# Patient Record
Sex: MALE | Race: WHITE | NOT HISPANIC OR LATINO | URBAN - METROPOLITAN AREA
[De-identification: names, ages, dates, MRNs, and addresses within clinical notes are randomized per-mention and may not be internally consistent; named-entity substitution may affect disease eponyms.]

---

## 2020-10-01 ENCOUNTER — AMBULATORY SURGICAL CENTER (OUTPATIENT)
Dept: URBAN - METROPOLITAN AREA SURGERY 3 | Facility: SURGERY | Age: 65
End: 2020-10-01
Payer: COMMERCIAL

## 2020-10-01 VITALS
HEART RATE: 55 BPM | OXYGEN SATURATION: 98 % | HEART RATE: 54 BPM | DIASTOLIC BLOOD PRESSURE: 56 MMHG | DIASTOLIC BLOOD PRESSURE: 56 MMHG | HEART RATE: 55 BPM | SYSTOLIC BLOOD PRESSURE: 164 MMHG | SYSTOLIC BLOOD PRESSURE: 164 MMHG | SYSTOLIC BLOOD PRESSURE: 164 MMHG | DIASTOLIC BLOOD PRESSURE: 55 MMHG | SYSTOLIC BLOOD PRESSURE: 111 MMHG | TEMPERATURE: 96.5 F | RESPIRATION RATE: 16 BRPM | DIASTOLIC BLOOD PRESSURE: 84 MMHG | DIASTOLIC BLOOD PRESSURE: 65 MMHG | HEIGHT: 69 IN | RESPIRATION RATE: 16 BRPM | DIASTOLIC BLOOD PRESSURE: 55 MMHG | TEMPERATURE: 96.5 F | RESPIRATION RATE: 20 BRPM | RESPIRATION RATE: 20 BRPM | SYSTOLIC BLOOD PRESSURE: 111 MMHG | SYSTOLIC BLOOD PRESSURE: 109 MMHG | SYSTOLIC BLOOD PRESSURE: 105 MMHG | OXYGEN SATURATION: 97 % | HEIGHT: 69 IN | SYSTOLIC BLOOD PRESSURE: 105 MMHG | DIASTOLIC BLOOD PRESSURE: 55 MMHG | RESPIRATION RATE: 16 BRPM | HEART RATE: 64 BPM | DIASTOLIC BLOOD PRESSURE: 74 MMHG | HEIGHT: 69 IN | OXYGEN SATURATION: 97 % | HEART RATE: 54 BPM | WEIGHT: 145 LBS | HEART RATE: 57 BPM | HEART RATE: 55 BPM | OXYGEN SATURATION: 100 % | WEIGHT: 145 LBS | HEART RATE: 64 BPM | TEMPERATURE: 98.4 F | HEART RATE: 57 BPM | SYSTOLIC BLOOD PRESSURE: 105 MMHG | OXYGEN SATURATION: 98 % | HEART RATE: 57 BPM | HEART RATE: 64 BPM | RESPIRATION RATE: 18 BRPM | OXYGEN SATURATION: 98 % | DIASTOLIC BLOOD PRESSURE: 65 MMHG | OXYGEN SATURATION: 97 % | OXYGEN SATURATION: 100 % | HEART RATE: 54 BPM | RESPIRATION RATE: 18 BRPM | SYSTOLIC BLOOD PRESSURE: 111 MMHG | DIASTOLIC BLOOD PRESSURE: 65 MMHG | SYSTOLIC BLOOD PRESSURE: 109 MMHG | WEIGHT: 145 LBS | TEMPERATURE: 98.4 F | DIASTOLIC BLOOD PRESSURE: 74 MMHG | RESPIRATION RATE: 20 BRPM | DIASTOLIC BLOOD PRESSURE: 84 MMHG | DIASTOLIC BLOOD PRESSURE: 56 MMHG | DIASTOLIC BLOOD PRESSURE: 74 MMHG | OXYGEN SATURATION: 100 % | TEMPERATURE: 96.5 F | TEMPERATURE: 98.4 F | SYSTOLIC BLOOD PRESSURE: 109 MMHG | RESPIRATION RATE: 18 BRPM | DIASTOLIC BLOOD PRESSURE: 84 MMHG

## 2020-10-01 DIAGNOSIS — K64.1 SECOND DEGREE HEMORRHOIDS: ICD-10-CM

## 2020-10-01 DIAGNOSIS — Z12.11 ENCOUNTER FOR SCREENING FOR MALIGNANT NEOPLASM OF COLON: ICD-10-CM

## 2024-05-30 ENCOUNTER — TELEPHONE (OUTPATIENT)
Dept: CARDIOLOGY | Facility: CLINIC | Age: 69
End: 2024-05-30
Payer: MEDICARE

## 2024-05-30 NOTE — TELEPHONE ENCOUNTER
----- Message from Deniz Beard sent at 5/30/2024  2:08 PM CDT -----  Type:  Sooner Appointment Request    Caller is requesting a sooner appointment.  Caller declined first available appointment listed below.  Caller will not accept being placed on the waitlist and is requesting a message be sent to doctor.    Name of Caller:  pt  When is the first available appointment?  none  Symptoms:  est care/ 6 month f/u from previous PCP--had bypass  Would the patient rather a call back or a response via [facility]? call  Best Call Back Number:  [phone] (home)     Additional Information:  please call and advise--thank you

## 2024-07-31 ENCOUNTER — LAB VISIT (OUTPATIENT)
Dept: LAB | Facility: HOSPITAL | Age: 69
End: 2024-07-31
Attending: INTERNAL MEDICINE
Payer: MEDICARE

## 2024-07-31 ENCOUNTER — OFFICE VISIT (OUTPATIENT)
Dept: CARDIOLOGY | Facility: CLINIC | Age: 69
End: 2024-07-31
Payer: MEDICARE

## 2024-07-31 VITALS
BODY MASS INDEX: 21.62 KG/M2 | SYSTOLIC BLOOD PRESSURE: 140 MMHG | HEIGHT: 69 IN | RESPIRATION RATE: 16 BRPM | WEIGHT: 146 LBS | HEART RATE: 51 BPM | DIASTOLIC BLOOD PRESSURE: 70 MMHG | OXYGEN SATURATION: 98 %

## 2024-07-31 DIAGNOSIS — F41.9 ANXIETY DISORDER, UNSPECIFIED TYPE: ICD-10-CM

## 2024-07-31 DIAGNOSIS — R94.31 NONSPECIFIC ABNORMAL ELECTROCARDIOGRAM (ECG) (EKG): ICD-10-CM

## 2024-07-31 DIAGNOSIS — F32.A DEPRESSION, UNSPECIFIED DEPRESSION TYPE: ICD-10-CM

## 2024-07-31 DIAGNOSIS — I10 ESSENTIAL HYPERTENSION: ICD-10-CM

## 2024-07-31 DIAGNOSIS — I25.10 CAD IN NATIVE ARTERY: Primary | ICD-10-CM

## 2024-07-31 DIAGNOSIS — I25.10 CAD IN NATIVE ARTERY: ICD-10-CM

## 2024-07-31 DIAGNOSIS — Z95.1 HX OF CABG: ICD-10-CM

## 2024-07-31 DIAGNOSIS — E78.2 MIXED HYPERLIPIDEMIA: ICD-10-CM

## 2024-07-31 LAB
ALBUMIN SERPL BCP-MCNC: 4.6 G/DL (ref 3.5–5.2)
ALP SERPL-CCNC: 74 U/L (ref 55–135)
ALT SERPL W/O P-5'-P-CCNC: 24 U/L (ref 10–44)
ANION GAP SERPL CALC-SCNC: 11 MMOL/L (ref 8–16)
AST SERPL-CCNC: 23 U/L (ref 10–40)
BILIRUB SERPL-MCNC: 1 MG/DL (ref 0.1–1)
BUN SERPL-MCNC: 14 MG/DL (ref 8–23)
CALCIUM SERPL-MCNC: 9.2 MG/DL (ref 8.7–10.5)
CHLORIDE SERPL-SCNC: 105 MMOL/L (ref 95–110)
CHOLEST SERPL-MCNC: 127 MG/DL (ref 120–199)
CHOLEST/HDLC SERPL: 2.8 {RATIO} (ref 2–5)
CO2 SERPL-SCNC: 25 MMOL/L (ref 23–29)
CREAT SERPL-MCNC: 0.9 MG/DL (ref 0.5–1.4)
ERYTHROCYTE [DISTWIDTH] IN BLOOD BY AUTOMATED COUNT: 13.1 % (ref 11.5–14.5)
EST. GFR  (NO RACE VARIABLE): >60 ML/MIN/1.73 M^2
GLUCOSE SERPL-MCNC: 107 MG/DL (ref 70–110)
HCT VFR BLD AUTO: 39.6 % (ref 40–54)
HDLC SERPL-MCNC: 46 MG/DL (ref 40–75)
HDLC SERPL: 36.2 % (ref 20–50)
HGB BLD-MCNC: 13.4 G/DL (ref 14–18)
LDLC SERPL CALC-MCNC: 53 MG/DL (ref 63–159)
MCH RBC QN AUTO: 30.6 PG (ref 27–31)
MCHC RBC AUTO-ENTMCNC: 33.8 G/DL (ref 32–36)
MCV RBC AUTO: 90 FL (ref 82–98)
NONHDLC SERPL-MCNC: 81 MG/DL
PLATELET # BLD AUTO: 221 K/UL (ref 150–450)
PMV BLD AUTO: 9.5 FL (ref 9.2–12.9)
POTASSIUM SERPL-SCNC: 3.9 MMOL/L (ref 3.5–5.1)
PROT SERPL-MCNC: 6.9 G/DL (ref 6–8.4)
RBC # BLD AUTO: 4.38 M/UL (ref 4.6–6.2)
SODIUM SERPL-SCNC: 141 MMOL/L (ref 136–145)
TRIGL SERPL-MCNC: 140 MG/DL (ref 30–150)
WBC # BLD AUTO: 6.19 K/UL (ref 3.9–12.7)

## 2024-07-31 PROCEDURE — 99213 OFFICE O/P EST LOW 20 MIN: CPT | Mod: PBBFAC,PN | Performed by: INTERNAL MEDICINE

## 2024-07-31 PROCEDURE — 80061 LIPID PANEL: CPT | Performed by: INTERNAL MEDICINE

## 2024-07-31 PROCEDURE — 99205 OFFICE O/P NEW HI 60 MIN: CPT | Mod: S$PBB,,, | Performed by: INTERNAL MEDICINE

## 2024-07-31 PROCEDURE — 36415 COLL VENOUS BLD VENIPUNCTURE: CPT | Performed by: INTERNAL MEDICINE

## 2024-07-31 PROCEDURE — 80053 COMPREHEN METABOLIC PANEL: CPT | Performed by: INTERNAL MEDICINE

## 2024-07-31 PROCEDURE — 85027 COMPLETE CBC AUTOMATED: CPT | Performed by: INTERNAL MEDICINE

## 2024-07-31 PROCEDURE — 99999 PR PBB SHADOW E&M-EST. PATIENT-LVL III: CPT | Mod: PBBFAC,,, | Performed by: INTERNAL MEDICINE

## 2024-07-31 RX ORDER — CLOPIDOGREL BISULFATE 75 MG/1
75 TABLET ORAL DAILY
Qty: 30 TABLET | Refills: 11 | Status: SHIPPED | OUTPATIENT
Start: 2024-07-31 | End: 2025-07-31

## 2024-07-31 RX ORDER — ASPIRIN 81 MG/1
81 TABLET ORAL DAILY
COMMUNITY
Start: 2024-06-03

## 2024-07-31 RX ORDER — AMLODIPINE BESYLATE 5 MG/1
5 TABLET ORAL 2 TIMES DAILY
Qty: 180 TABLET | Refills: 3 | Status: SHIPPED | OUTPATIENT
Start: 2024-07-31 | End: 2025-07-31

## 2024-07-31 RX ORDER — EZETIMIBE 10 MG/1
10 TABLET ORAL DAILY
Qty: 90 TABLET | Refills: 3 | Status: SHIPPED | OUTPATIENT
Start: 2024-07-31 | End: 2025-07-31

## 2024-07-31 RX ORDER — CARVEDILOL 6.25 MG/1
6.25 TABLET ORAL 2 TIMES DAILY
COMMUNITY
Start: 2024-06-03 | End: 2024-07-31 | Stop reason: SDUPTHER

## 2024-07-31 RX ORDER — ALPRAZOLAM 0.5 MG/1
0.5 TABLET ORAL 2 TIMES DAILY PRN
COMMUNITY
Start: 2024-06-03

## 2024-07-31 RX ORDER — SERTRALINE HYDROCHLORIDE 50 MG/1
50 TABLET, FILM COATED ORAL DAILY
COMMUNITY
Start: 2024-04-19

## 2024-07-31 RX ORDER — AMLODIPINE BESYLATE 5 MG/1
1 TABLET ORAL 2 TIMES DAILY
COMMUNITY
Start: 2024-06-03 | End: 2024-07-31 | Stop reason: SDUPTHER

## 2024-07-31 RX ORDER — CLOPIDOGREL BISULFATE 75 MG/1
75 TABLET ORAL DAILY
COMMUNITY
Start: 2024-06-03 | End: 2024-07-31 | Stop reason: SDUPTHER

## 2024-07-31 RX ORDER — ROSUVASTATIN CALCIUM 10 MG/1
10 TABLET, COATED ORAL NIGHTLY
Qty: 90 TABLET | Refills: 3 | Status: SHIPPED | OUTPATIENT
Start: 2024-07-31 | End: 2025-07-31

## 2024-07-31 RX ORDER — AMOXICILLIN 500 MG
1 CAPSULE ORAL DAILY
COMMUNITY

## 2024-07-31 RX ORDER — CARVEDILOL 6.25 MG/1
6.25 TABLET ORAL 2 TIMES DAILY
Qty: 180 TABLET | Refills: 3 | Status: SHIPPED | OUTPATIENT
Start: 2024-07-31 | End: 2025-07-31

## 2024-07-31 RX ORDER — LANOLIN ALCOHOL/MO/W.PET/CERES
400 CREAM (GRAM) TOPICAL DAILY
COMMUNITY

## 2024-07-31 RX ORDER — EZETIMIBE 10 MG/1
1 TABLET ORAL DAILY
COMMUNITY
Start: 2024-06-03 | End: 2024-07-31 | Stop reason: SDUPTHER

## 2024-07-31 RX ORDER — ROSUVASTATIN CALCIUM 10 MG/1
10 TABLET, COATED ORAL NIGHTLY
COMMUNITY
Start: 2024-06-03 | End: 2024-07-31 | Stop reason: SDUPTHER

## 2024-07-31 NOTE — PROGRESS NOTES
Subjective:    Patient ID:  Manpreet Cummings is a 69 y.o. male      Chief Complaint   Patient presents with    Coronary Artery Disease    Follow-up       HPI:  Mr Manpreet Cummings is a 69 y.o. male is here for initial consultation.  [place] and he is reestablishing himself.    Patient had coronary artery disease and CABG about 2 years ago and has done well and he follows up with his cardiologist on regular basis.  At the present time patient is comfortable seated in chair not in any acute distress his breathing is good denies any shortness a breath or difficulty in breathing denies any dizziness or lightheadedness or loss of consciousness.  He is taking all his medications regularly.    Normally his blood pressure runs around in 130-135 in the range and diastolic is about 70-75.  Patient is active and he is an active runner and he runs on a regular basis.  And has been doing that for the past 25 years.  And is taking all his medications regularly.      He has not had any recent blood work.        Review of patient's allergies indicates:  No Known Allergies    Past Medical History:   Diagnosis Date    Coronary artery disease     Hyperlipidemia     Hypertension      Past Surgical History:   Procedure Laterality Date    CARDIAC CATHETERIZATION      CORONARY ARTERY BYPASS GRAFT       Social History     Tobacco Use    Smoking status: Never    Smokeless tobacco: Never     No family history on file.     Review of Systems:   Constitution: Negative for diaphoresis and fever.   HEENT: Negative for nosebleeds.    Cardiovascular: Negative for chest pain       No dyspnea on exertion       No leg swelling        No palpitations  Respiratory: Negative for shortness of breath and wheezing.    Hematologic/Lymphatic: Negative for bleeding problem. Does not bruise/bleed easily.   Skin: Negative for color change and rash.   Musculoskeletal: Negative for falls and myalgias.   Gastrointestinal: Negative for  "hematemesis and hematochezia.   Genitourinary: Negative for hematuria.   Neurological: Negative for dizziness and light-headedness.   Psychiatric/Behavioral: Negative for altered mental status and memory loss.          Objective:        Vitals:    07/31/24 0914   BP: (!) 140/70   Pulse: (!) 51   Resp: 16       No results found for: "WBC", "HGB", "HCT", "PLT", "CHOL", "TRIG", "HDL", "LDLDIRECT", "ALT", "AST", "NA", "K", "CL", "CREATININE", "BUN", "CO2", "TSH", "PSA", "INR", "GLUF", "HGBA1C", "MICROALBUR"     ECHOCARDIOGRAM RESULTS  No results found for this or any previous visit.        CURRENT/PREVIOUS VISIT EKG  No results found for this or any previous visit.  No valid procedures specified.   No results found for this or any previous visit.      Physical Exam:  CONSTITUTIONAL: No fever, no chills  HEENT: Normocephalic, atraumatic,pupils reactive to light                 NECK:  No JVD no carotid bruit  CVS: S1S2+, RRR, systolic murmurs,   LUNGS: Clear  ABDOMEN: Soft, NT, BS+  EXTREMITIES: No cyanosis, edema  : No gomez catheter  NEURO: AAO X 3  PSY: Normal affect      Medication List with Changes/Refills   Current Medications    ALPRAZOLAM (XANAX) 0.5 MG TABLET    Take 0.5 mg by mouth 2 (two) times daily as needed for Anxiety.    AMLODIPINE (NORVASC) 5 MG TABLET    Take 1 tablet by mouth 2 (two) times daily.    ASPIRIN (ECOTRIN) 81 MG EC TABLET    Take 81 mg by mouth once daily.    CARVEDILOL (COREG) 6.25 MG TABLET    Take 6.25 mg by mouth 2 (two) times daily.    CLOPIDOGREL (PLAVIX) 75 MG TABLET    Take 75 mg by mouth once daily.    EZETIMIBE (ZETIA) 10 MG TABLET    Take 1 tablet by mouth once daily.    MAGNESIUM OXIDE (MAG-OX) 400 MG (241.3 MG MAGNESIUM) TABLET    Take 400 mg by mouth once daily.    MULTIVITAMIN WITH MINERALS TABLET    Take 1 tablet by mouth once daily.    OMEGA-3 FATTY ACIDS/FISH OIL (FISH OIL-OMEGA-3 FATTY ACIDS) 300-1,000 MG CAPSULE    Take 1 capsule by mouth once daily.    ROSUVASTATIN " (CRESTOR) 10 MG TABLET    Take 10 mg by mouth every evening.    SERTRALINE (ZOLOFT) 50 MG TABLET    Take 50 mg by mouth once daily.             Assessment:       1. CAD in native artery    2. Hx of CABG    3. Essential hypertension    4. Mixed hyperlipidemia    5. Nonspecific abnormal electrocardiogram (ECG) (EKG)    6. Anxiety disorder, unspecified type    7. Depression, unspecified depression type         Plan:     1. CAD status post CABG.    Patient is doing well at the present time.  No specific issues.  Patient is on aspirin 81 mg p.o. daily and Plavix 75 mg p.o. daily.    Discussed with patient the effects of aspirin.  Probably we can stop taking aspirin and continue Plavix 75 mg p.o. daily.  At the present time no bleeding issues no blood in the stool or urine.  2. Essential hypertension   Today patient's blood pressure is 140/70.  And normally his blood pressure runs about 130/70.  He is on amlodipine 5 mg p.o. b.i.d. continue the same he is also on carvedilol 6.25 mg p.o. b.i.d. continue the same.  3. Mixed hyperlipidemia   He is currently on omega-3 fatty acids rosuvastatin 10 mg p.o. daily Zetia 10 mg p.o. daily continue the same on his last blood work he had a total cholesterol 123 HDL of 43 LDL of 65 and triglycerides of 75.  Patient has not had blood work in the past 6 months will need to repeat his blood work.  Will do a CMP and a cholesterol panel and a CBC.    4. EKG   Reviewed his EKG independently patient is in normal sinus rhythm /sinus bradycardia with a heart rate of 51 beats per minute with normal intervals with a leftward axis and evidence of septal infarct age undetermined.  5. Patient is also taking Zoloft 50 mg p.o. daily continue the same.  6. Anxiety   He does take Xanax as needed.  And he needs to continue Zoloft.  He is also on magnesium oxide 400 mg p.o. daily.  7. Will also do a 2D echocardiogram for LV function ejection fraction wall motion abnormality.  8. I will see him back in  the office in 6 months time          Problem List Items Addressed This Visit    None  Visit Diagnoses       CAD in native artery    -  Primary    Hx of CABG        Essential hypertension        Mixed hyperlipidemia        Nonspecific abnormal electrocardiogram (ECG) (EKG)        Relevant Orders    IN OFFICE EKG 12-LEAD (to [facility])    Anxiety disorder, unspecified type        Depression, unspecified depression type                No follow-ups on file.

## 2024-10-04 ENCOUNTER — TELEPHONE (OUTPATIENT)
Dept: CARDIOLOGY | Facility: CLINIC | Age: 69
End: 2024-10-04
Payer: MEDICARE

## 2024-10-04 NOTE — TELEPHONE ENCOUNTER
----- Message from Conchita sent at 10/4/2024  9:30 AM CDT -----  Contact: Patient  Type:  Patient Returning Call    Who Called:  Patient  Who Left Message for Patient:  Not sure  Does the patient know what this is regarding?:  Scheduling tests    Would the patient rather a call back or a response via [facility]?   Call back  Best Call Back Number:  [phone]    Additional Information:   States he is returning a missed call - please call back - thank you

## 2024-11-01 ENCOUNTER — TELEPHONE (OUTPATIENT)
Dept: CARDIOLOGY | Facility: HOSPITAL | Age: 69
End: 2024-11-01

## 2024-11-04 ENCOUNTER — HOSPITAL ENCOUNTER (OUTPATIENT)
Dept: CARDIOLOGY | Facility: HOSPITAL | Age: 69
Discharge: HOME | End: 2024-11-04
Attending: INTERNAL MEDICINE
Payer: MEDICARE

## 2024-11-04 VITALS — BODY MASS INDEX: 21.62 KG/M2 | HEIGHT: 69 IN | WEIGHT: 145.94 LBS

## 2024-11-04 DIAGNOSIS — F32.A DEPRESSION, UNSPECIFIED DEPRESSION TYPE: ICD-10-CM

## 2024-11-04 DIAGNOSIS — R94.31 NONSPECIFIC ABNORMAL ELECTROCARDIOGRAM (ECG) (EKG): ICD-10-CM

## 2024-11-04 DIAGNOSIS — F41.9 ANXIETY DISORDER, UNSPECIFIED TYPE: ICD-10-CM

## 2024-11-04 DIAGNOSIS — E78.2 MIXED HYPERLIPIDEMIA: ICD-10-CM

## 2024-11-04 DIAGNOSIS — I25.10 CAD IN NATIVE ARTERY: ICD-10-CM

## 2024-11-04 DIAGNOSIS — I10 ESSENTIAL HYPERTENSION: ICD-10-CM

## 2024-11-04 DIAGNOSIS — Z95.1 HX OF CABG: ICD-10-CM

## 2024-11-04 LAB
AORTIC ROOT ANNULUS: 3.5 CM
AORTIC VALVE CUSP SEPERATION: 2.5 CM
APICAL FOUR CHAMBER EJECTION FRACTION: 59 %
APICAL TWO CHAMBER EJECTION FRACTION: 61 %
AV INDEX (PROSTH): 0.79
AV MEAN GRADIENT: 5 MMHG
AV PEAK GRADIENT: 10.2 MMHG
AV REGURGITATION PRESSURE HALF TIME: 817 MS
AV VALVE AREA BY VELOCITY RATIO: 3.4 CM²
AV VALVE AREA: 3.3 CM²
AV VELOCITY RATIO: 0.81
BSA FOR ECHO PROCEDURE: 1.8 M2
CV ECHO LV RWT: 0.42 CM
CV STRESS BASE HR: 52 BPM
DIASTOLIC BLOOD PRESSURE: 78 MMHG
DOP CALC AO PEAK VEL: 1.6 M/S
DOP CALC AO VTI: 38.7 CM
DOP CALC LVOT AREA: 4.2 CM2
DOP CALC LVOT DIAMETER: 2.3 CM
DOP CALC LVOT PEAK VEL: 1.3 M/S
DOP CALC LVOT STROKE VOLUME: 126.7 CM3
DOP CALC MV VTI: 36.2 CM
DOP CALCLVOT PEAK VEL VTI: 30.5 CM
E WAVE DECELERATION TIME: 219 MSEC
E/A RATIO: 2
E/E' RATIO: 8.89 M/S
ECHO LV POSTERIOR WALL: 1.1 CM (ref 0.6–1.1)
FRACTIONAL SHORTENING: 34.6 % (ref 28–44)
INTERVENTRICULAR SEPTUM: 1.1 CM (ref 0.6–1.1)
IVC DIAMETER: 2 CM
IVRT: 111 MSEC
LEFT ATRIUM AREA SYSTOLIC (APICAL 2 CHAMBER): 25.9 CM2
LEFT ATRIUM AREA SYSTOLIC (APICAL 4 CHAMBER): 22.6 CM2
LEFT ATRIUM SIZE: 4.3 CM
LEFT ATRIUM VOLUME INDEX MOD: 47.8 ML/M2
LEFT ATRIUM VOLUME MOD: 86.5 ML
LEFT INTERNAL DIMENSION IN SYSTOLE: 3.4 CM (ref 2.1–4)
LEFT VENTRICLE DIASTOLIC VOLUME INDEX: 71.82 ML/M2
LEFT VENTRICLE DIASTOLIC VOLUME: 130 ML
LEFT VENTRICLE END DIASTOLIC VOLUME APICAL 2 CHAMBER: 152 ML
LEFT VENTRICLE END DIASTOLIC VOLUME APICAL 4 CHAMBER: 164 ML
LEFT VENTRICLE END SYSTOLIC VOLUME APICAL 2 CHAMBER: 103 ML
LEFT VENTRICLE END SYSTOLIC VOLUME APICAL 4 CHAMBER: 72.6 ML
LEFT VENTRICLE MASS INDEX: 122 G/M2
LEFT VENTRICLE SYSTOLIC VOLUME INDEX: 26.2 ML/M2
LEFT VENTRICLE SYSTOLIC VOLUME: 47.4 ML
LEFT VENTRICULAR INTERNAL DIMENSION IN DIASTOLE: 5.2 CM (ref 3.5–6)
LEFT VENTRICULAR MASS: 220.8 G
LV LATERAL E/E' RATIO: 6.67 M/S
LV SEPTAL E/E' RATIO: 13.33 M/S
LVED V (TEICH): 130 ML
LVES V (TEICH): 47.4 ML
LVOT MG: 3 MMHG
LVOT MV: 0.78 CM/S
MV MEAN GRADIENT: 1 MMHG
MV PEAK A VEL: 0.4 M/S
MV PEAK E VEL: 0.8 M/S
MV PEAK GRADIENT: 3 MMHG
MV VALVE AREA BY CONTINUITY EQUATION: 3.5 CM2
OHS CV CPX 1 MINUTE RECOVERY HEART RATE: 71 BPM
OHS CV CPX 85 PERCENT MAX PREDICTED HEART RATE MALE: 130
OHS CV CPX ESTIMATED METS: 10
OHS CV CPX MAX PREDICTED HEART RATE: 153
OHS CV CPX PATIENT IS FEMALE: 0
OHS CV CPX PATIENT IS MALE: 1
OHS CV CPX PEAK DIASTOLIC BLOOD PRESSURE: 92 MMHG
OHS CV CPX PEAK HEAR RATE: 96 BPM
OHS CV CPX PEAK RATE PRESSURE PRODUCT: NORMAL
OHS CV CPX PEAK SYSTOLIC BLOOD PRESSURE: 220 MMHG
OHS CV CPX PERCENT MAX PREDICTED HEART RATE ACHIEVED: 63
OHS CV CPX RATE PRESSURE PRODUCT PRESENTING: 8320
OHS LV EJECTION FRACTION SIMPSONS BIPLANE MOD: 60 %
PISA AR MAX VEL: 2.68 M/S
PISA TR MAX VEL: 2.25 M/S
PV MV: 0.86 M/S
PV PEAK GRADIENT: 7 MMHG
PV PEAK VELOCITY: 1.33 M/S
RA PRESSURE ESTIMATED: 3 MMHG
RIGHT VENTRICULAR END-DIASTOLIC DIMENSION: 2.3 CM
RV TB RVSP: 5 MMHG
RV TISSUE DOPPLER FREE WALL SYSTOLIC VELOCITY 1 (APICAL 4 CHAMBER VIEW): 11.4 CM/S
STRESS ECHO POST EXERCISE DUR MIN: 7 MINUTES
STRESS ECHO POST EXERCISE DUR SEC: 51 SECONDS
SYSTOLIC BLOOD PRESSURE: 160 MMHG
TDI LATERAL: 0.12 M/S
TDI SEPTAL: 0.06 M/S
TDI: 0.09 M/S
TR MAX PG: 20 MMHG
TRICUSPID ANNULAR PLANE SYSTOLIC EXCURSION: 2.17 CM
TV REST PULMONARY ARTERY PRESSURE: 23 MMHG
Z-SCORE OF LEFT VENTRICULAR DIMENSION IN END DIASTOLE: 0.38
Z-SCORE OF LEFT VENTRICULAR DIMENSION IN END SYSTOLE: 0.75

## 2024-11-04 PROCEDURE — 93016 CV STRESS TEST SUPVJ ONLY: CPT | Mod: ,,, | Performed by: NURSE PRACTITIONER

## 2024-11-04 PROCEDURE — 93306 TTE W/DOPPLER COMPLETE: CPT | Mod: 26,,, | Performed by: INTERNAL MEDICINE

## 2024-11-04 PROCEDURE — 93306 TTE W/DOPPLER COMPLETE: CPT

## 2024-11-04 PROCEDURE — 93017 CV STRESS TEST TRACING ONLY: CPT

## 2024-11-04 PROCEDURE — 93018 CV STRESS TEST I&R ONLY: CPT | Mod: ,,, | Performed by: INTERNAL MEDICINE

## 2024-11-26 ENCOUNTER — OFFICE VISIT (OUTPATIENT)
Dept: CARDIOLOGY | Facility: CLINIC | Age: 69
End: 2024-11-26
Payer: MEDICARE

## 2024-11-26 ENCOUNTER — TELEPHONE (OUTPATIENT)
Dept: CARDIOLOGY | Facility: CLINIC | Age: 69
End: 2024-11-26

## 2024-11-26 VITALS
SYSTOLIC BLOOD PRESSURE: 163 MMHG | HEART RATE: 55 BPM | WEIGHT: 151.88 LBS | OXYGEN SATURATION: 98 % | DIASTOLIC BLOOD PRESSURE: 87 MMHG | BODY MASS INDEX: 22.43 KG/M2

## 2024-11-26 DIAGNOSIS — F41.9 ANXIETY: ICD-10-CM

## 2024-11-26 DIAGNOSIS — Z95.1 HX OF CABG: ICD-10-CM

## 2024-11-26 DIAGNOSIS — I10 ESSENTIAL HYPERTENSION: Primary | ICD-10-CM

## 2024-11-26 DIAGNOSIS — E78.5 DYSLIPIDEMIA: ICD-10-CM

## 2024-11-26 DIAGNOSIS — R20.0 NUMBNESS: ICD-10-CM

## 2024-11-26 PROCEDURE — 99213 OFFICE O/P EST LOW 20 MIN: CPT | Mod: PBBFAC,PN

## 2024-11-26 PROCEDURE — 99999 PR PBB SHADOW E&M-EST. PATIENT-LVL III: CPT | Mod: PBBFAC,,,

## 2024-11-26 RX ORDER — LOSARTAN POTASSIUM 25 MG/1
25 TABLET ORAL DAILY
Qty: 90 TABLET | Refills: 3 | Status: SHIPPED | OUTPATIENT
Start: 2024-11-26 | End: 2025-11-26

## 2024-11-26 NOTE — ASSESSMENT & PLAN NOTE
BP today 163/87, elevated. Patient reports ongoing elevated BP at home as well.   Start losartan 25 mg daily. Continue Coreg 6.25 mg BID and amlodipine 5 mg BID.  Recommend continuing to keep a home log of BP throughout the day. If BP remains elevated, would recommend increasing losartan dose.  Continue low sodium diet and exercise.

## 2024-11-26 NOTE — ASSESSMENT & PLAN NOTE
No episodes of angina.   Recent echocardiogram 11/4/2024 with normal systolic and diastolic function.  Treadmill stress 11/4/2024 showed no evidence of myocardial ischemia, achieved METS 10.  Continue Coreg 6.25 mg BID, Plavix 75 mg daily, and rosuvastatin 10 mg nightly.

## 2024-11-26 NOTE — ASSESSMENT & PLAN NOTE
Latest Reference Range & Units 07/31/24 10:13   Cholesterol Total 120 - 199 mg/dL 127   HDL 40 - 75 mg/dL 46   HDL/Cholesterol Ratio 20.0 - 50.0 % 36.2   Non-HDL Cholesterol mg/dL 81   Total Cholesterol/HDL Ratio 2.0 - 5.0  2.8   Triglycerides 30 - 150 mg/dL 140   LDL Cholesterol 63.0 - 159.0 mg/dL 53.0 (L)   Continue rosuvastatin 10 mg nightly, Zetia 10 mg daily, Plavix 75 mg daily, combined with low fat diet and exercise.

## 2024-11-26 NOTE — PROGRESS NOTES
Subjective:    Patient ID:  Manpreet Cummings is a 69 y.o. male who presents for follow-up of CAD, HTN, and HLD.  Chief Complaint   Patient presents with    Results       HPI: Patient with history of CABGx4 2022, HTN, and HLD. He recently reports his SBP at home has been elevated to 140-150s despite medication adherence.   He denies chest pain, shortness of breath, palpitations, or other issues. He continues to long distance run daily. He reports dietary compliance.      Review of patient's allergies indicates:  No Known Allergies    Past Medical History:   Diagnosis Date    Coronary artery disease     Hyperlipidemia     Hypertension      Past Surgical History:   Procedure Laterality Date    CARDIAC CATHETERIZATION      CORONARY ARTERY BYPASS GRAFT       Social History     Tobacco Use    Smoking status: Never    Smokeless tobacco: Never     No family history on file.     Review of Systems:   Constitution: Negative for diaphoresis and fever.   HEENT: Negative for nosebleeds.    Cardiovascular: Negative for chest pain       No dyspnea on exertion       No leg swelling        No palpitations  Respiratory: Negative for shortness of breath and wheezing.    Hematologic/Lymphatic: Negative for bleeding problem. Does not bruise/bleed easily.   Skin: Negative for color change and rash.   Musculoskeletal: Negative for falls and myalgias.   Gastrointestinal: Negative for hematemesis and hematochezia.   Genitourinary: Negative for hematuria.   Neurological: Negative for dizziness and light-headedness.   Psychiatric/Behavioral: Negative for altered mental status and memory loss.          Objective:        Vitals:    11/26/24 1510   BP: (!) 163/87   Pulse: (!) 55       Lab Results   Component Value Date    WBC 6.19 07/31/2024    HGB 13.4 (L) 07/31/2024    HCT 39.6 (L) 07/31/2024     07/31/2024    CHOL 127 07/31/2024    TRIG 140 07/31/2024    HDL 46 07/31/2024    ALT 24 07/31/2024    AST 23 07/31/2024     07/31/2024    K 3.9  07/31/2024     07/31/2024    CREATININE 0.9 07/31/2024    BUN 14 07/31/2024    CO2 25 07/31/2024        ECHOCARDIOGRAM RESULTS  Results for orders placed during the hospital encounter of 11/04/24    Echo    Interpretation Summary    Left Ventricle: The left ventricle is normal in size. Normal wall thickness. There is normal systolic function with a visually estimated ejection fraction of 60 - 65%. There is normal diastolic function.    Right Ventricle: Normal right ventricular cavity size. Wall thickness is normal. Systolic function is normal.    Aortic Valve: The aortic valve is structurally normal. Mildly calcified noncoronary cusp. There is mild to moderate aortic regurgitation with an eccentrically directed jet.    IVC/SVC: Normal venous pressure at 3 mmHg.        CURRENT/PREVIOUS VISIT EKG  Results for orders placed or performed in visit on 07/31/24   IN OFFICE EKG 12-LEAD (to [employer])    Collection Time: 07/31/24  9:13 AM   Result Value Ref Range    QRS Duration 116 ms    [facility] QTC Calculation 425 ms    Narrative    Test Reason : R94.31,    Vent. Rate : 051 BPM     Atrial Rate : 051 BPM     P-R Int : 168 ms          QRS Dur : 116 ms      QT Int : 462 ms       P-R-T Axes : 044 -74 044 degrees     QTc Int : 425 ms    Sinus bradycardia  Left anterior fascicular block  Septal infarct ,age undetermined  Abnormal ECG  No previous ECGs available  Confirmed by Mannie LAYTON, Manuel RAMAN ([id number]) on 8/27/2024 6:34:51 PM    Referred By: [id number]   SELF           Confirmed By:Manuel Chand MD     No valid procedures specified.   Results for orders placed during the hospital encounter of 11/04/24    Exercise Stress - EKG    Interpretation Summary    The patient exercised for 7 minutes 51 seconds on a Armond protocol, corresponding to a functional capacity of 10METS, achieving a peak heart rate of 96 bpm, which is 63% of the age predicted maximum heart rate.    The ECG portion of the study is negative for ischemia.    The  patient reported no chest pain during the stress test.    The blood pressure response to stress was normal.    There were no arrhythmias during stress.      Physical Exam:  CONSTITUTIONAL: No fever, no chills  HEENT: Normocephalic, atraumatic,pupils reactive to light                 NECK:  No JVD no carotid bruit  CVS: S1S2+, RRR, no murmurs,   LUNGS: Clear  ABDOMEN: Soft, NT, BS+  EXTREMITIES: No cyanosis, edema  : No gomez catheter  NEURO: AAO X 3  PSY: Normal affect      Medication List with Changes/Refills   New Medications    LOSARTAN (COZAAR) 25 MG TABLET    Take 1 tablet (25 mg total) by mouth once daily.   Current Medications    ALPRAZOLAM (XANAX) 0.5 MG TABLET    Take 0.5 mg by mouth 2 (two) times daily as needed for Anxiety.    AMLODIPINE (NORVASC) 5 MG TABLET    Take 1 tablet (5 mg total) by mouth 2 (two) times daily.    CARVEDILOL (COREG) 6.25 MG TABLET    Take 1 tablet (6.25 mg total) by mouth 2 (two) times daily.    CLOPIDOGREL (PLAVIX) 75 MG TABLET    Take 1 tablet (75 mg total) by mouth once daily.    EZETIMIBE (ZETIA) 10 MG TABLET    Take 1 tablet (10 mg total) by mouth once daily.    MAGNESIUM OXIDE (MAG-OX) 400 MG (241.3 MG MAGNESIUM) TABLET    Take 400 mg by mouth once daily.    MULTIVITAMIN WITH MINERALS TABLET    Take 1 tablet by mouth once daily.    OMEGA-3 FATTY ACIDS/FISH OIL (FISH OIL-OMEGA-3 FATTY ACIDS) 300-1,000 MG CAPSULE    Take 1 capsule by mouth once daily.    ROSUVASTATIN (CRESTOR) 10 MG TABLET    Take 1 tablet (10 mg total) by mouth every evening.    SERTRALINE (ZOLOFT) 50 MG TABLET    Take 50 mg by mouth once daily.   Discontinued Medications    ASPIRIN (ECOTRIN) 81 MG EC TABLET    Take 81 mg by mouth once daily.             Assessment:       1. Essential hypertension    2. Hx of CABG    3. Dyslipidemia    4. Anxiety    5. Numbness         Plan:     Problem List Items Addressed This Visit          Neuro    Numbness    Overview     To left chest, since CABG 2022         Current  Assessment & Plan     Continue Magnesium supplements.            Psychiatric    Anxiety    Current Assessment & Plan     Reports ongoing, intermittent anxiety. Continue with current antianxiety regimen.             Cardiac/Vascular    Essential hypertension - Primary    Current Assessment & Plan     BP today 163/87, elevated. Patient reports ongoing elevated BP at home as well.   Start losartan 25 mg daily. Continue Coreg 6.25 mg BID and amlodipine 5 mg BID.  Recommend continuing to keep a home log of BP throughout the day. If BP remains elevated, would recommend increasing losartan dose.  Continue low sodium diet and exercise.         Hx of CABG    Overview     CABG x4 with left internal mammary artery graft to the LAD and single reversed saphenous vein graft to the intermediate, obtuse marginal, and posterior descending         Current Assessment & Plan     No episodes of angina.   Recent echocardiogram 11/4/2024 with normal systolic and diastolic function.  Treadmill stress 11/4/2024 showed no evidence of myocardial ischemia, achieved METS 10.  Continue Coreg 6.25 mg BID, Plavix 75 mg daily, and rosuvastatin 10 mg nightly.         Dyslipidemia    Current Assessment & Plan      Latest Reference Range & Units 07/31/24 10:13   Cholesterol Total 120 - 199 mg/dL 127   HDL 40 - 75 mg/dL 46   HDL/Cholesterol Ratio 20.0 - 50.0 % 36.2   Non-HDL Cholesterol mg/dL 81   Total Cholesterol/HDL Ratio 2.0 - 5.0  2.8   Triglycerides 30 - 150 mg/dL 140   LDL Cholesterol 63.0 - 159.0 mg/dL 53.0 (L)   Continue rosuvastatin 10 mg nightly, Zetia 10 mg daily, Plavix 75 mg daily, combined with low fat diet and exercise.            Follow up in about 6 months (around 5/26/2025).

## 2024-12-06 ENCOUNTER — TELEPHONE (OUTPATIENT)
Dept: CARDIOLOGY | Facility: CLINIC | Age: 69
End: 2024-12-06
Payer: MEDICARE

## 2024-12-11 ENCOUNTER — PATIENT MESSAGE (OUTPATIENT)
Dept: CARDIOLOGY | Facility: CLINIC | Age: 69
End: 2024-12-11
Payer: MEDICARE